# Patient Record
Sex: FEMALE | Race: BLACK OR AFRICAN AMERICAN | Employment: OTHER | URBAN - METROPOLITAN AREA
[De-identification: names, ages, dates, MRNs, and addresses within clinical notes are randomized per-mention and may not be internally consistent; named-entity substitution may affect disease eponyms.]

---

## 2022-07-03 RX ORDER — TRAZODONE HYDROCHLORIDE 50 MG/1
TABLET ORAL
COMMUNITY
End: 2022-08-25 | Stop reason: SDUPTHER

## 2022-07-03 RX ORDER — DULOXETIN HYDROCHLORIDE 30 MG/1
1 CAPSULE, DELAYED RELEASE ORAL
COMMUNITY
End: 2022-08-02 | Stop reason: SDUPTHER

## 2022-07-03 RX ORDER — PRIMIDONE 50 MG/1
TABLET ORAL
COMMUNITY

## 2022-08-03 ENCOUNTER — TELEPHONE (OUTPATIENT)
Dept: ADMINISTRATIVE | Age: 77
End: 2022-08-03

## 2022-08-03 NOTE — TELEPHONE ENCOUNTER
Patient requesting refill for duloxetine 30mg once daily for a 90 day supply. Last OV was 8/2/2022 and next is 09/16/2022.

## 2022-08-15 PROBLEM — E11.9 TYPE 2 DIABETES MELLITUS (HCC): Status: ACTIVE | Noted: 2022-08-15

## 2022-08-29 PROBLEM — M85.869 OSTEOPENIA OF LOWER LEG: Status: ACTIVE | Noted: 2022-08-29

## 2022-08-29 PROBLEM — L91.0 KELOID: Status: ACTIVE | Noted: 2022-08-29

## 2022-08-29 PROBLEM — G62.9 NEUROPATHY: Status: ACTIVE | Noted: 2022-08-29

## 2022-08-29 PROBLEM — K21.9 GERD (GASTROESOPHAGEAL REFLUX DISEASE): Status: ACTIVE | Noted: 2022-08-29

## 2022-08-29 PROBLEM — R63.4 ABNORMAL WEIGHT LOSS: Status: ACTIVE | Noted: 2022-08-29

## 2022-08-29 PROBLEM — E78.5 HYPERLIPIDEMIA: Status: ACTIVE | Noted: 2022-08-29

## 2022-08-29 PROBLEM — F43.21 GRIEF REACTION: Status: ACTIVE | Noted: 2022-08-29

## 2022-08-29 PROBLEM — B35.1 ONYCHOMYCOSIS: Status: ACTIVE | Noted: 2022-08-29

## 2022-08-29 PROBLEM — G25.0 ESSENTIAL TREMOR: Status: ACTIVE | Noted: 2022-08-29

## 2022-08-29 PROBLEM — M81.0 AGE RELATED OSTEOPOROSIS: Status: ACTIVE | Noted: 2022-08-29

## 2022-08-29 PROBLEM — G47.00 INSOMNIA: Status: ACTIVE | Noted: 2022-08-29

## 2022-08-29 PROBLEM — F32.A DEPRESSION: Status: ACTIVE | Noted: 2022-08-29

## 2022-08-29 PROBLEM — J30.1 SEASONAL ALLERGIC RHINITIS DUE TO POLLEN: Status: ACTIVE | Noted: 2022-08-29

## 2022-08-29 PROBLEM — F41.9 ANXIETY: Status: ACTIVE | Noted: 2022-08-29

## 2022-09-16 PROBLEM — F32.A ANXIETY AND DEPRESSION: Status: ACTIVE | Noted: 2022-09-16

## 2022-09-16 PROBLEM — R63.4 ABNORMAL WEIGHT LOSS: Status: RESOLVED | Noted: 2022-08-29 | Resolved: 2022-09-16

## 2022-09-16 PROBLEM — F41.9 ANXIETY: Status: RESOLVED | Noted: 2022-08-29 | Resolved: 2022-09-16

## 2022-09-16 PROBLEM — M81.0 AGE RELATED OSTEOPOROSIS: Status: RESOLVED | Noted: 2022-08-29 | Resolved: 2022-09-16

## 2022-09-16 PROBLEM — F41.9 ANXIETY AND DEPRESSION: Status: ACTIVE | Noted: 2022-09-16

## 2022-09-16 PROBLEM — F32.A DEPRESSION: Status: RESOLVED | Noted: 2022-08-29 | Resolved: 2022-09-16

## 2022-10-03 PROBLEM — E11.9 DIABETES MELLITUS (HCC): Status: ACTIVE | Noted: 2022-10-03

## 2022-10-06 PROBLEM — D25.0 INTRAMURAL AND SUBMUCOUS LEIOMYOMA OF UTERUS: Status: ACTIVE | Noted: 2022-10-06

## 2022-10-06 PROBLEM — D25.1 INTRAMURAL AND SUBMUCOUS LEIOMYOMA OF UTERUS: Status: ACTIVE | Noted: 2022-10-06

## 2022-10-06 PROBLEM — K85.90 ACUTE PANCREATITIS: Status: ACTIVE | Noted: 2022-10-06

## 2022-10-06 PROBLEM — E11.9 DIABETES MELLITUS (HCC): Status: RESOLVED | Noted: 2022-10-03 | Resolved: 2022-10-06

## 2022-10-06 PROBLEM — F41.9 ANXIETY: Status: ACTIVE | Noted: 2022-10-06

## 2022-10-13 PROBLEM — R53.1 GENERALIZED WEAKNESS: Status: ACTIVE | Noted: 2022-10-13

## 2022-10-13 PROBLEM — N85.8 UTERINE MASS: Status: ACTIVE | Noted: 2022-10-13

## 2022-11-01 PROBLEM — R74.01 TRANSAMINITIS: Status: ACTIVE | Noted: 2022-11-01

## 2022-11-01 PROBLEM — R06.09 DYSPNEA ON EXERTION: Status: ACTIVE | Noted: 2022-11-01

## 2022-11-01 PROBLEM — R53.81 PHYSICAL DECONDITIONING: Status: ACTIVE | Noted: 2022-11-01

## 2022-11-01 PROBLEM — R06.09 DYSPNEA ON EXERTION: Status: RESOLVED | Noted: 2022-11-01 | Resolved: 2022-11-01
